# Patient Record
Sex: FEMALE | URBAN - METROPOLITAN AREA
[De-identification: names, ages, dates, MRNs, and addresses within clinical notes are randomized per-mention and may not be internally consistent; named-entity substitution may affect disease eponyms.]

---

## 2024-06-11 ENCOUNTER — LAB OUTSIDE AN ENCOUNTER (OUTPATIENT)
Dept: URBAN - METROPOLITAN AREA SURGERY CENTER 25 | Facility: SURGERY CENTER | Age: 69
End: 2024-06-11

## 2024-06-13 ENCOUNTER — OFFICE VISIT (OUTPATIENT)
Dept: URBAN - METROPOLITAN AREA SURGERY CENTER 25 | Facility: SURGERY CENTER | Age: 69
End: 2024-06-13
Payer: MEDICARE

## 2024-06-13 ENCOUNTER — OUT OF OFFICE VISIT (OUTPATIENT)
Dept: URBAN - METROPOLITAN AREA SURGERY CENTER 25 | Facility: SURGERY CENTER | Age: 69
End: 2024-06-13

## 2024-06-13 ENCOUNTER — CLAIMS CREATED FROM THE CLAIM WINDOW (OUTPATIENT)
Dept: URBAN - METROPOLITAN AREA CLINIC 4 | Facility: CLINIC | Age: 69
End: 2024-06-13
Payer: MEDICARE

## 2024-06-13 DIAGNOSIS — D12.4 ADENOMA OF DESCENDING COLON: ICD-10-CM

## 2024-06-13 DIAGNOSIS — Z86.010 ADENOMAS PERSONAL HISTORY OF COLONIC POLYPS: ICD-10-CM

## 2024-06-13 DIAGNOSIS — D12.3 ADENOMA OF TRANSVERSE COLON: ICD-10-CM

## 2024-06-13 DIAGNOSIS — K57.30 COLON, DIVERTICULOSIS: ICD-10-CM

## 2024-06-13 DIAGNOSIS — K63.5 POLYP OF COLON: ICD-10-CM

## 2024-06-13 DIAGNOSIS — K63.5 BENIGN COLON POLYPS: ICD-10-CM

## 2024-06-13 DIAGNOSIS — D12.3 BENIGN NEOPLASM OF TRANSVERSE COLON: ICD-10-CM

## 2024-06-13 DIAGNOSIS — Z09 ENCNTR FOR F/U EXAM AFT TRTMT FOR COND OTH THAN MALIG NEOPLM: ICD-10-CM

## 2024-06-13 DIAGNOSIS — D12.4 BENIGN NEOPLASM OF DESCENDING COLON: ICD-10-CM

## 2024-06-13 PROCEDURE — 00811 ANES LWR INTST NDSC NOS: CPT | Performed by: ANESTHESIOLOGY

## 2024-06-13 PROCEDURE — 88305 TISSUE EXAM BY PATHOLOGIST: CPT | Performed by: PATHOLOGY

## 2024-06-13 PROCEDURE — 45385 COLONOSCOPY W/LESION REMOVAL: CPT | Performed by: INTERNAL MEDICINE

## 2024-06-13 PROCEDURE — 45380 COLONOSCOPY AND BIOPSY: CPT | Performed by: INTERNAL MEDICINE

## 2024-06-13 PROCEDURE — 00811 ANES LWR INTST NDSC NOS: CPT | Performed by: NURSE ANESTHETIST, CERTIFIED REGISTERED

## 2024-08-14 ENCOUNTER — OFFICE VISIT (OUTPATIENT)
Dept: URBAN - METROPOLITAN AREA CLINIC 113 | Facility: CLINIC | Age: 69
End: 2024-08-14
Payer: MEDICARE

## 2024-08-14 ENCOUNTER — DASHBOARD ENCOUNTERS (OUTPATIENT)
Age: 69
End: 2024-08-14

## 2024-08-14 VITALS
TEMPERATURE: 97.5 F | WEIGHT: 195 LBS | DIASTOLIC BLOOD PRESSURE: 77 MMHG | SYSTOLIC BLOOD PRESSURE: 149 MMHG | HEART RATE: 87 BPM | HEIGHT: 66 IN | BODY MASS INDEX: 31.34 KG/M2

## 2024-08-14 DIAGNOSIS — K57.30 COLON, DIVERTICULOSIS: ICD-10-CM

## 2024-08-14 DIAGNOSIS — Z86.010 HISTORY OF ADENOMATOUS POLYP OF COLON: ICD-10-CM

## 2024-08-14 PROCEDURE — 99213 OFFICE O/P EST LOW 20 MIN: CPT | Performed by: NURSE PRACTITIONER

## 2024-08-14 RX ORDER — PRAVASTATIN SODIUM 10 MG/1
2 TABLETS TABLET ORAL ONCE A DAY
Status: ACTIVE | COMMUNITY

## 2024-08-14 RX ORDER — METFORMIN HYDROCHLORIDE 500 MG/1
1 TABLET WITH A MEAL TABLET, FILM COATED ORAL TWICE A DAY
Status: ACTIVE | COMMUNITY

## 2024-08-14 RX ORDER — AMLODIPINE BESYLATE 10 MG/1
1 TABLET TABLET ORAL ONCE A DAY
Status: ACTIVE | COMMUNITY

## 2024-08-14 RX ORDER — BIMATOPROST 0.1 MG/ML
1 DROP IN BOTH EYES SOLUTION/ DROPS OPHTHALMIC AT BEDTIME
Status: ACTIVE | COMMUNITY

## 2024-08-14 RX ORDER — LEVOTHYROXINE SODIUM 50 UG/1
1 TABLET IN THE MORNING ON AN EMPTY STOMACH TABLET ORAL ONCE A DAY
Status: ACTIVE | COMMUNITY

## 2024-08-14 RX ORDER — SITAGLIPTIN 100 MG/1
1 TABLET TABLET, FILM COATED ORAL ONCE A DAY
Status: ACTIVE | COMMUNITY

## 2024-08-14 RX ORDER — VALSARTAN AND HYDROCHLOROTHIAZIDE 320; 25 MG/1; MG/1
1 TABLET TABLET, FILM COATED ORAL ONCE A DAY
Status: ACTIVE | COMMUNITY

## 2024-08-14 NOTE — HPI-TODAY'S VISIT:
This is a 68-year-old female with a history of diabetes, hypertension, hypothyroidism, hyperlipidemia, sleep apnea presenting for follow-up after colonoscopy for history of polyps.  Colonoscopy 6/13/2024:BBPS 8, removal of 9 sessile 5 to 10 mm descending, sigmoid, transverse polyps and a 1 to 3 mm sessile descending polyp, sigmoid and descending diverticulosis, internal hemorrhoids, otherwise normal.  Pathology: Sigmoid polyp was hyperplastic and transverse and descending polyps were sessile serrated adenomas.  Surveillance recommended in 2027. She denies any abdominal symptoms.  She had difficulty with nausea during bowel preparation.

## 2024-08-14 NOTE — HPI-OTHER HISTORIES
Colonoscopy 6/13/2024:BBPS 8, removal of 9 sessile 5 to 10 mm descending, sigmoid, transverse polyps and a 1 to 3 mm sessile descending polyp, sigmoid and descending diverticulosis, internal hemorrhoids, otherwise normal. Pathology: Sigmoid polyp was hyperplastic and transverse and descending polyps were sessile serrated adenomas. Surveillance recommended in 2027. recall set

## 2024-08-17 PROBLEM — 429047008: Status: ACTIVE | Noted: 2024-08-17

## 2024-08-17 PROBLEM — 733657002: Status: ACTIVE | Noted: 2024-08-17
